# Patient Record
Sex: FEMALE | Race: WHITE
[De-identification: names, ages, dates, MRNs, and addresses within clinical notes are randomized per-mention and may not be internally consistent; named-entity substitution may affect disease eponyms.]

---

## 2021-12-02 ENCOUNTER — HOSPITAL ENCOUNTER (OUTPATIENT)
Dept: HOSPITAL 47 - ORWHC2ENDO | Age: 61
Discharge: HOME | End: 2021-12-02
Attending: SURGERY
Payer: MEDICARE

## 2021-12-02 VITALS — DIASTOLIC BLOOD PRESSURE: 80 MMHG | HEART RATE: 70 BPM | SYSTOLIC BLOOD PRESSURE: 169 MMHG | RESPIRATION RATE: 18 BRPM

## 2021-12-02 VITALS — BODY MASS INDEX: 34.2 KG/M2

## 2021-12-02 VITALS — TEMPERATURE: 96.8 F

## 2021-12-02 DIAGNOSIS — K64.4: ICD-10-CM

## 2021-12-02 DIAGNOSIS — Z80.0: ICD-10-CM

## 2021-12-02 DIAGNOSIS — E78.5: ICD-10-CM

## 2021-12-02 DIAGNOSIS — E11.9: ICD-10-CM

## 2021-12-02 DIAGNOSIS — F31.9: ICD-10-CM

## 2021-12-02 DIAGNOSIS — L21.9: ICD-10-CM

## 2021-12-02 DIAGNOSIS — K21.9: Primary | ICD-10-CM

## 2021-12-02 DIAGNOSIS — K62.5: ICD-10-CM

## 2021-12-02 DIAGNOSIS — Z79.899: ICD-10-CM

## 2021-12-02 DIAGNOSIS — I10: ICD-10-CM

## 2021-12-02 DIAGNOSIS — F41.9: ICD-10-CM

## 2021-12-02 DIAGNOSIS — K29.50: ICD-10-CM

## 2021-12-02 LAB — GLUCOSE BLD-MCNC: 151 MG/DL (ref 75–99)

## 2021-12-02 PROCEDURE — 88305 TISSUE EXAM BY PATHOLOGIST: CPT

## 2021-12-02 PROCEDURE — 43239 EGD BIOPSY SINGLE/MULTIPLE: CPT

## 2021-12-02 PROCEDURE — 45378 DIAGNOSTIC COLONOSCOPY: CPT

## 2021-12-02 NOTE — P.OP
Date of Procedure: 12/02/21


Preoperative Diagnosis: 


GERD





Rectal bleeding


Postoperative Diagnosis: 


Antral gastritis





Poor colonic prep





Mild external hemorrhoids


Procedure(s) Performed: 


EGD





Colonoscopy


Anesthesia: MAC


Surgeon: Noe Oneal


Pathology: other (Antrum)


Condition: stable


Disposition: PACU


Description of Procedure: 


Patient's placed on the endoscopy table in the lateral position.  She received 

IV sedation.  The gastro-/oropharynx passed in the esophagus into the stomach.  

Scope was placed through the pylorus.  The first and second portion of the 

duodenum appeared normal.  Scope was then brought back the antrum this was 

mildly inflamed.  A biopsies performed.  The scope was then retroflexed and the 

remainder of the stomach appeared normal.  There was no significant hiatal 

hernia.  The GE junction was at 39 cm.  The distal esophagus appeared normal.  

The proximal esophagus appeared normal.  Scope was withdrawn for patient.  

Repair of next digital rectal exam was performed which revealed a few external 

hemorrhoids.  The flexible colonoscope was then placed patient anus and passed 

throughout colon.  The patient had a poor colonic prep.  There was a large 

amount of solid stool in the transverse colon.  The scope could not be advanced 

beyond this.  Scope was withdrawn.  In the distal transverse descending and 

sigmoid colon there was no evidence of any polyps or tumors.  However the view 

was quite limited due to the poor colonic prep.  There is a large amount of 

stool in the left colon.  The scope was then withdrawn the rectum and this 

appeared normal.  Scope withdrawn for patient.





If the patient's rectal bleeding persists she will need to be reprepped for 

colonoscopy

## 2021-12-02 NOTE — P.GSHP
History of Present Illness


H&P Date: 21


Chief Complaint: GERD, rectal bleeding





This is a 61-year-old female who presents today for EGD and colonoscopy.  She 

had issues with GERD rectal bleeding.





Past Medical History


Past Medical History: Diabetes Mellitus, GERD/Reflux, Hyperlipidemia, 

Hypertension, Skin Disorder


Additional Past Medical History / Comment(s): Short and long term memory 

problems. Ezcema, Sebaceous Dermatitis.


History of Any Multi-Drug Resistant Organisms: None Reported


Past Surgical History: Tubal Ligation


Additional Past Surgical History / Comment(s): Colonoscopy.


Past Anesthesia/Blood Transfusion Reactions: No Reported Reaction


Past Psychological History: Anxiety, Bipolar, Depression


Smoking Status: Never smoker


Past Alcohol Use History: Rare


Past Drug Use History: None Reported





- Past Family History


  ** Brother(s)


Family Medical History: Cancer


Additional Family Medical History / Comment(s): Liver cancer.





  ** Father


Family Medical History: Myocardial Infarction (MI)


Additional Family Medical History / Comment(s):  at age 56.





Medications and Allergies


                                Home Medications











 Medication  Instructions  Recorded  Confirmed  Type


 


ALPRAZolam [Xanax] 0.5 mg PO QID PRN 21 History


 


Ascorbic Acid [Vitamin C with Luisa 500 mg PO DAILY 21 History





Hips]    


 


Atorvastatin Calcium [Lipitor] 10 mg PO DAILY 21 History


 


Calcium Carbonate/Vitamin D3 2 each PO DAILY 21 History





[Calcium 600 mg-D3 20 mcg (800    





unit)]    


 


Famotidine 40 mg PO DAILY 21 History


 


L.acidoph,Paracasei, B.lactis 1 each PO DAILY 21 History





[Probiotic]    


 


Lutein 20 mg PO DAILY 21 History


 


Multivitamins, Thera [Multivitamin 1 tab PO DAILY 21 History





(formulary)]    


 


OLANZapine [ZyPREXA] 2.5 mg PO DAILY 21 History


 


Pioglitazone [Actos] 30 mg PO DAILY 21 History


 


Venlafaxine HCl [Effexor] 75 mg PO DAILY 21 History


 


lamoTRIgine [LaMICtal] 100 mg PO BID 21 History


 


lisinopriL [Zestril] 5 mg PO QAM 21 History


 


metFORMIN  mg PO BID 21 History








                                    Allergies











Allergy/AdvReac Type Severity Reaction Status Date / Time


 


No Known Allergies Allergy   Verified 21 09:37














Surgical - Exam


                                   Vital Signs











Temp Pulse Resp Pulse Ox


 


 96.8 F L  71   16   96 


 


 21 09:39  21 09:39  21 09:39  21 09:39














- General


well developed, well nourished, no distress





- Eyes


PERRL





- ENT


normal pinna





- Neck


no masses





- Respiratory


normal expansion





- Cardiovascular


Rhythm: regular





- Abdomen


Abdomen: soft, non tender





Results





- Labs


                  Abnormal Lab Results - Last 24 Hours (Table)











  21 Range/Units





  09:56 


 


POC Glucose (mg/dL)  151 H  (75-99)  mg/dL














Assessment and Plan


Assessment: 





GERD, rectal bleeding.  We'll perform EGD and colonoscopy.